# Patient Record
Sex: MALE | Race: WHITE | NOT HISPANIC OR LATINO | URBAN - METROPOLITAN AREA
[De-identification: names, ages, dates, MRNs, and addresses within clinical notes are randomized per-mention and may not be internally consistent; named-entity substitution may affect disease eponyms.]

---

## 2017-04-03 ENCOUNTER — OFFICE VISIT - HEALTHEAST (OUTPATIENT)
Dept: INTERNAL MEDICINE | Facility: CLINIC | Age: 42
End: 2017-04-03

## 2017-04-03 DIAGNOSIS — R50.9 FEVER: ICD-10-CM

## 2017-04-03 DIAGNOSIS — R04.2 HEMOPTYSIS: ICD-10-CM

## 2017-04-03 DIAGNOSIS — J20.9 ACUTE BRONCHITIS, UNSPECIFIED ORGANISM: ICD-10-CM

## 2017-04-03 ASSESSMENT — MIFFLIN-ST. JEOR: SCORE: 1891.66

## 2020-03-16 ENCOUNTER — VIRTUAL VISIT (OUTPATIENT)
Dept: FAMILY MEDICINE | Facility: OTHER | Age: 45
End: 2020-03-16

## 2020-03-16 ENCOUNTER — COMMUNICATION - HEALTHEAST (OUTPATIENT)
Dept: INTERNAL MEDICINE | Facility: CLINIC | Age: 45
End: 2020-03-16

## 2020-03-19 NOTE — PROGRESS NOTES
"Date: 2020 18:44:21  Clinician: Angelina Kevin  Clinician NPI: [id number]  Patient: Adolfo Baig  Patient : 1975  Patient Address: [address], [place] [address]  Patient Phone: [phone]  Visit Protocol: URI  Patient Summary:  Adolfo is a 44 year old ( : 1975 ) male who initiated a Visit for COVID-19 (Coronavirus) evaluation and screening. When asked the question \"Please sign me up to receive news, health information and promotions. \", Adolfo responded \"No\".    Adolfo states his symptoms started 1-2 days ago.   His symptoms consist of malaise, enlarged lymph nodes, myalgia, a sore throat, and a cough. He is experiencing mild difficulty breathing with activities but can speak normally in full sentences.   Symptom details     Cough: Adolfo coughs a few times an hour and his cough is not more bothersome at night. Phlegm does not come into his throat when he coughs. He does not believe his cough is caused by post-nasal drip.     Sore throat: Adolfo reports having mild throat pain (1-3 on a 10 point pain scale), does not have exudate on his tonsils, and can swallow liquids. The lymph nodes in his neck are enlarged. A rash has not appeared on the skin since the sore throat started.      Adolfo denies having ear pain, headache, rhinitis, facial pain or pressure, wheezing, nasal congestion, teeth pain, fever, and chills. He also denies having recent facial or sinus surgery in the past 60 days and taking antibiotic medication for the symptoms.   Precipitating events  Adolfo is not sure if he has been exposed to someone with strep throat. He has not recently been exposed to someone with influenza. Adolfo has been in close contact with the following high risk individuals: children under the age of 5.   Pertinent COVID-19 (Coronavirus) information  Adolfo has not traveled internationally or to the areas where COVID-19 (Coronavirus) is widespread in the last 14 days before the start of his symptoms.   " Adolfo has had close contact with a suspected or laboratory-confirmed COVID-19 patient within 14 days of symptom onset.   Adolfo is not a healthcare worker and does not work in a healthcare facility.   Pertinent medical history  Adolfo needs a return to work/school note.   Weight: 215 lbs   Adolfo does not smoke or use smokeless tobacco.   Additional information as reported by the patient (free text): My wife was exposed to a confirmed case of COVID-19 last Wednesday. We both now have mild symptoms.   Weight: 215 lbs    MEDICATIONS: No current medications, ALLERGIES: NKDA  Clinician Response:  Dear Adolfo,   Based on the information you have provided, you do have symptoms that are consistent with Coronavirus (COVID-19).   The coronavirus causes mild to severe respiratory illness with the most common symptoms including fever, cough and difficulty breathing. Unfortunately, many viruses cause similar symptoms and it can be difficult to distinguish between viruses, especially in mild cases, so we are presuming that anyone with cough or fever has coronavirus at this time.  Coronavirus/COVID-19 has reached the point of community spread in [place], meaning that we are finding the virus in people with no known exposure risk for basia the virus. Given the increasing commonness of coronavirus in the community we are focusing testing on those people at highest risk of developing significant complications of the disease. This includes people who are over age 60, have Hypertension, Diabetes, Heart conditions such as heart failure or previous heart attack, end stage renal disease, chronic liver conditions, COPD or Emphysema, Asthma, Interstitial Lung Disease, Cancer, transplant recipients, HIV, individuals on chemotherapy or immunosuppressive medications.  If you believe you may have a condition that puts you at high risk that is not listed above, please contact your specialty provider to discuss if you should be tested.   Those such as yourself who are younger and healthy may not be offered testing and should assume are infected with coronavirus. Accordingly, you should self-quarantine for fourteen days from the first day your symptoms started. You should call if you find increasing shortness of breath, wheezing or sustained fever above 101.5. If you are significantly short of breath or experience chest pain you should call 911 or report to the nearest emergency department for urgent evaluation.   Isolate Yourself:    Isolate yourself at home.   Do Not allow any visitors  Do Not go to work or school  Do Not go to [employer],  centers, shopping, or other public places.  Do Not shake hands.  Avoid close contact with others (hugging, kissing).Protect Others:     Cover Your Mouth and Nose with a mask, disposable tissue or wash cloth to avoid spreading germs to others.  Wash your hands and face frequently with soap and water.   If you develop significant shortness of breath that prevents you from doing normal activities, please call 911 or proceed to the nearest emergency room and alert them immediately that you have been in self-isolation for possible coronavirus.   For more information about COVID19 and options for caring for yourself at home, please visit the CDC website at https://www.cdc.gov/coronavirus/2019-ncov/about/steps-when-sick.htmlFor more options for care at [facility], please visit our website at [email or web address]     Diagnosis: Cough  Diagnosis ICD: R05

## 2020-09-02 ENCOUNTER — NURSE TRIAGE (OUTPATIENT)
Dept: NURSING | Facility: CLINIC | Age: 45
End: 2020-09-02

## 2020-09-02 NOTE — TELEPHONE ENCOUNTER
"Pt calls in asking for anti body testing for covid  Says had rapid test done in a \" pop up place\" on [address] in [facility] - about a month ago-- says it came back negative - pt concerned it was a false negative    Pt then goes on to say for the last month   Has had a >    Sore throat  Non-productive cough   Muscle aches   Slight SOB - like \" theres a cloth over my mouth \" - when puts mask on - is even worst - says its \" like I cant get a full breath \"  No fever    Pt talking in full sentences -   No labored breathing noted    Explained antibody testing is for when you have been asymptomatic for 14 days   That pt needs to have a nasopharyngeal test done for covid  Advised  1 -[email or web address] ( help number also given )  2- call clinic  3- [facility]     Also advised if spikes a temp -104-105 or if breathing DOES become labored - more difficulty breathing to go to ED     Protocol and care advice reviewed  Caller states understanding of the recommended disposition  Advised to call back if further questions or concerns    David Varner RN / [facility]              Reason for Disposition    [1] Caller requesting NON-URGENT health information AND [2] PCP's office is the best resource    Protocols used: INFORMATION ONLY CALL-A-AH      "

## 2021-05-30 VITALS — WEIGHT: 207.2 LBS | HEIGHT: 74 IN | BODY MASS INDEX: 26.59 KG/M2

## 2021-06-09 NOTE — PROGRESS NOTES
ASSESSMENT:  1. Acute bronchitis, unspecified organism  Acute bronchitis, cannot rule out influenza last week (less reliable rapid test at this point).  X-ray was done to check for secondary bacterial pneumonia as well as evaluate other causes for the one episode of hemoptysis- this was normal.  Treat for bronchitis with prednisone.  I think the difficulty breathing stems from history of asthma.  Vitals normal.   - predniSONE (DELTASONE) 20 MG tablet; Take 2 tablets (40 mg total) by mouth daily for 7 days.  Dispense: 14 tablet; Refill: 0    2. Hemoptysis  See #1  - XR Chest PA and Lateral    3. Fever  Negative  - Influenza A/B Rapid Test    PLAN:  Patient Instructions   Acute bronchitis    This could be a secondary infection.  No sign of pneumonia, no lung masses.      Prednisone for breathing (helps dilate airways)       Orders Placed This Encounter   Procedures     Influenza A/B Rapid Test     XR Chest PA and Lateral     Order Specific Question:   Reason for Exam (Describe Symptoms):     Answer:   hemoptysis     Order Specific Question:   Can the procedure be changed per Radiologist protocol?     Answer:   Yes     Medications Discontinued During This Encounter   Medication Reason     fluticasone (FLONASE) 50 mcg/actuation nasal spray Therapy completed       Return if symptoms worsen or fail to improve, for Next scheduled follow up.    CHIEF COMPLAINT:  Chief Complaint   Patient presents with     Cough     x 1 week     Fever     on and off     Rash     Routine Health Maintenance     tdap       HISTORY OF PRESENT ILLNESS:  Adolfo is a 41 y.o. male presenting to the clinic today with complaints of hemoptysis. He reports that last week he and his wife began to feel very ill and he believes that he contracted influenza. His symptoms began suddenly with onset of fever, diffuse muscle aches, severe head aches and a progressive cough. He has felt short of breath for the past 4 days and for the past 48 hours he has noticed  "crackles in his lungs when he takes a deep breath. He has been avoiding breathing deeply so as to avoid provoking a coughing spell. He also mentions that he feels as though his lungs are ripping apart when he coughs. Two days ago, during a violent coughing fit, he had an episode of hemoptysis wherein he noticed a large amount of blood mixed with his sputum. This morning, during another coughing fit he expectorated thick, dark sputum that he thinks might have also had some blood mixed in. He has felt exceedingly short of breath and unable to get sufficiently perfused. Of note, he recalls a history of adolescent asthma but has not had any breathing issues since he was about 11.     REVIEW OF SYSTEMS:   He denies any diarrhea.   All other systems are negative.    PFSH:  He stopped smoking 6 years ago.   He has a 2 year old son who started coughing 2 days ago.     TOBACCO USE:  History   Smoking Status     Former Smoker     Types: Cigarettes     Start date: 1993     Quit date: 2010   Smokeless Tobacco     Not on file       VITALS:  Vitals:    04/03/17 1458   BP: 124/86   Patient Site: Left Arm   Patient Position: Sitting   Cuff Size: Adult Regular   Pulse: 84   Temp: 98.4  F (36.9  C)   Weight: 207 lb 3.2 oz (94 kg)   Height: 6' 1.5\" (1.867 m)     Wt Readings from Last 3 Encounters:   04/03/17 207 lb 3.2 oz (94 kg)   06/27/16 211 lb 12.8 oz (96.1 kg)       PHYSICAL EXAM:  Lungs: Right lower lung crackles. And upper airway crackles in the anterior lung fields. Mildly labored breathing.   Heart:  Regular rate and rhythm, no murmurs.   General:  Alert, pleasant male. Appears ill.   Oxymetry: 98%. Heart rate 74.    X-ray:  No cardiomegaly or pleural effusion or infiltrate.     ADDITIONAL HISTORY SUMMARIZED (2): Notes from 6/2016 reviewed regarding health maintenance.  DECISION TO OBTAIN EXTRA INFORMATION (1): None.   RADIOLOGY TESTS (1): Chest x-ray ordered.  LABS (1): Labs ordered.  MEDICINE TESTS (1): None.  INDEPENDENT " REVIEW (2 each): X-ray personally interpreted.     QUALITY MEASURES:  The following high BMI interventions were performed this visit: encouragement to exercise     The visit lasted a total of 20 minutes face to face with the patient. Over 50% of the time was spent counseling and educating the patient about influenza and bronchitis.    IFranko, am scribing for and in the presence of, Dr. Curry.    I, Dr. Curry, personally performed the services described in this documentation, as scribed by Franko Scruggs in my presence, and it is both accurate and complete.    MEDICATIONS:  Current Outpatient Prescriptions   Medication Sig Dispense Refill     predniSONE (DELTASONE) 20 MG tablet Take 2 tablets (40 mg total) by mouth daily for 7 days. 14 tablet 0     No current facility-administered medications for this visit.        Total data points: 6.     Franko Curry, DO  Internal Medicine  [facility]

## 2021-06-27 ENCOUNTER — HEALTH MAINTENANCE LETTER (OUTPATIENT)
Age: 46
End: 2021-06-27

## 2021-10-17 ENCOUNTER — HEALTH MAINTENANCE LETTER (OUTPATIENT)
Age: 46
End: 2021-10-17

## 2022-07-23 ENCOUNTER — HEALTH MAINTENANCE LETTER (OUTPATIENT)
Age: 47
End: 2022-07-23

## 2022-10-01 ENCOUNTER — HEALTH MAINTENANCE LETTER (OUTPATIENT)
Age: 47
End: 2022-10-01

## 2023-04-14 ENCOUNTER — LAB REQUISITION (OUTPATIENT)
Dept: LAB | Facility: CLINIC | Age: 48
End: 2023-04-14

## 2023-04-14 DIAGNOSIS — M25.50 PAIN IN UNSPECIFIED JOINT: ICD-10-CM

## 2023-04-14 DIAGNOSIS — H57.89 OTHER SPECIFIED DISORDERS OF EYE AND ADNEXA: ICD-10-CM

## 2023-04-14 DIAGNOSIS — Z80.42 FAMILY HISTORY OF MALIGNANT NEOPLASM OF PROSTATE: ICD-10-CM

## 2023-04-14 DIAGNOSIS — Z13.220 ENCOUNTER FOR SCREENING FOR LIPOID DISORDERS: ICD-10-CM

## 2023-04-14 LAB
ALBUMIN SERPL BCG-MCNC: 4.6 G/DL (ref 3.5–5.2)
ALP SERPL-CCNC: 59 U/L (ref 40–129)
ALT SERPL W P-5'-P-CCNC: 32 U/L (ref 10–50)
ANION GAP SERPL CALCULATED.3IONS-SCNC: 17 MMOL/L (ref 7–15)
AST SERPL W P-5'-P-CCNC: 22 U/L (ref 10–50)
BILIRUB SERPL-MCNC: 0.4 MG/DL
BUN SERPL-MCNC: 14.2 MG/DL (ref 6–20)
CALCIUM SERPL-MCNC: 9.7 MG/DL (ref 8.6–10)
CHLORIDE SERPL-SCNC: 104 MMOL/L (ref 98–107)
CHOLEST SERPL-MCNC: 196 MG/DL
CREAT SERPL-MCNC: 0.82 MG/DL (ref 0.67–1.17)
CRP SERPL-MCNC: <3 MG/L
DEPRECATED HCO3 PLAS-SCNC: 20 MMOL/L (ref 22–29)
ERYTHROCYTE [SEDIMENTATION RATE] IN BLOOD BY WESTERGREN METHOD: 10 MM/HR (ref 0–15)
GFR SERPL CREATININE-BSD FRML MDRD: >90 ML/MIN/1.73M2
GLUCOSE SERPL-MCNC: 92 MG/DL (ref 70–99)
HDLC SERPL-MCNC: 45 MG/DL
LDLC SERPL CALC-MCNC: 132 MG/DL
NONHDLC SERPL-MCNC: 151 MG/DL
POTASSIUM SERPL-SCNC: 4.4 MMOL/L (ref 3.4–5.3)
PROT SERPL-MCNC: 6.9 G/DL (ref 6.4–8.3)
PSA SERPL DL<=0.01 NG/ML-MCNC: 1.77 NG/ML (ref 0–2.5)
SODIUM SERPL-SCNC: 141 MMOL/L (ref 136–145)
TRIGL SERPL-MCNC: 94 MG/DL
TSH SERPL DL<=0.005 MIU/L-ACNC: 0.99 UIU/ML (ref 0.3–4.2)

## 2023-04-14 PROCEDURE — G0103 PSA SCREENING: HCPCS | Performed by: PHYSICIAN ASSISTANT

## 2023-04-14 PROCEDURE — 81374 HLA I TYPING 1 ANTIGEN LR: CPT | Performed by: PHYSICIAN ASSISTANT

## 2023-04-14 PROCEDURE — 80061 LIPID PANEL: CPT | Performed by: PHYSICIAN ASSISTANT

## 2023-04-14 PROCEDURE — 84443 ASSAY THYROID STIM HORMONE: CPT | Performed by: PHYSICIAN ASSISTANT

## 2023-04-14 PROCEDURE — 80053 COMPREHEN METABOLIC PANEL: CPT | Performed by: PHYSICIAN ASSISTANT

## 2023-04-14 PROCEDURE — 85652 RBC SED RATE AUTOMATED: CPT | Performed by: PHYSICIAN ASSISTANT

## 2023-04-14 PROCEDURE — 86235 NUCLEAR ANTIGEN ANTIBODY: CPT | Performed by: PHYSICIAN ASSISTANT

## 2023-04-14 PROCEDURE — 86431 RHEUMATOID FACTOR QUANT: CPT | Performed by: PHYSICIAN ASSISTANT

## 2023-04-14 PROCEDURE — 86038 ANTINUCLEAR ANTIBODIES: CPT | Performed by: PHYSICIAN ASSISTANT

## 2023-04-14 PROCEDURE — 86140 C-REACTIVE PROTEIN: CPT | Performed by: PHYSICIAN ASSISTANT

## 2023-04-17 LAB
ANA PAT SER IF-IMP: ABNORMAL
ANA SER QL IF: ABNORMAL
ANA TITR SER IF: ABNORMAL {TITER}
ENA SS-A AB SER IA-ACNC: <0.5 U/ML
ENA SS-A AB SER IA-ACNC: NEGATIVE
ENA SS-B IGG SER IA-ACNC: <0.6 U/ML
ENA SS-B IGG SER IA-ACNC: NEGATIVE
RHEUMATOID FACT SER NEPH-ACNC: <7 IU/ML

## 2023-04-21 LAB
B LOCUS: NORMAL
B27TEST METHOD: NORMAL

## 2023-05-04 ENCOUNTER — LAB REQUISITION (OUTPATIENT)
Dept: LAB | Facility: CLINIC | Age: 48
End: 2023-05-04

## 2023-05-04 DIAGNOSIS — M79.10 MYALGIA, UNSPECIFIED SITE: ICD-10-CM

## 2023-05-04 LAB
CK SERPL-CCNC: 89 U/L (ref 39–308)
MAGNESIUM SERPL-MCNC: 2.1 MG/DL (ref 1.7–2.3)
VIT B12 SERPL-MCNC: 463 PG/ML (ref 232–1245)

## 2023-05-04 PROCEDURE — 83735 ASSAY OF MAGNESIUM: CPT | Performed by: PHYSICIAN ASSISTANT

## 2023-05-04 PROCEDURE — 82306 VITAMIN D 25 HYDROXY: CPT | Performed by: PHYSICIAN ASSISTANT

## 2023-05-04 PROCEDURE — 82550 ASSAY OF CK (CPK): CPT | Performed by: PHYSICIAN ASSISTANT

## 2023-05-04 PROCEDURE — 83825 ASSAY OF MERCURY: CPT | Performed by: PHYSICIAN ASSISTANT

## 2023-05-04 PROCEDURE — 82607 VITAMIN B-12: CPT | Performed by: PHYSICIAN ASSISTANT

## 2023-05-05 LAB — DEPRECATED CALCIDIOL+CALCIFEROL SERPL-MC: 34 UG/L (ref 20–75)

## 2023-05-07 LAB
ARSENIC BLD-MCNC: <10 UG/L
LEAD BLDV-MCNC: <2 UG/DL
MERCURY BLD-MCNC: 3.1 UG/L

## 2023-05-15 ENCOUNTER — LAB REQUISITION (OUTPATIENT)
Dept: LAB | Facility: CLINIC | Age: 48
End: 2023-05-15

## 2023-05-15 DIAGNOSIS — M79.10 MYALGIA, UNSPECIFIED SITE: ICD-10-CM

## 2023-05-15 PROCEDURE — 86160 COMPLEMENT ANTIGEN: CPT | Performed by: PHYSICIAN ASSISTANT

## 2023-05-15 PROCEDURE — 86225 DNA ANTIBODY NATIVE: CPT | Performed by: PHYSICIAN ASSISTANT

## 2023-05-15 PROCEDURE — 86618 LYME DISEASE ANTIBODY: CPT | Performed by: PHYSICIAN ASSISTANT

## 2023-05-16 LAB
B BURGDOR IGG+IGM SER QL: 0.12
C3 SERPL-MCNC: 145 MG/DL (ref 81–157)

## 2023-05-18 LAB — DSDNA AB SER-ACNC: <0.6 IU/ML

## 2023-05-22 ENCOUNTER — LAB REQUISITION (OUTPATIENT)
Dept: LAB | Facility: CLINIC | Age: 48
End: 2023-05-22

## 2023-05-22 DIAGNOSIS — M79.10 MYALGIA, UNSPECIFIED SITE: ICD-10-CM

## 2023-05-22 LAB
BASOPHILS # BLD AUTO: 0.1 10E3/UL (ref 0–0.2)
BASOPHILS NFR BLD AUTO: 1 %
EOSINOPHIL # BLD AUTO: 0.3 10E3/UL (ref 0–0.7)
EOSINOPHIL NFR BLD AUTO: 4 %
ERYTHROCYTE [DISTWIDTH] IN BLOOD BY AUTOMATED COUNT: 13 % (ref 10–15)
HCT VFR BLD AUTO: 46.4 % (ref 40–53)
HGB BLD-MCNC: 15.2 G/DL (ref 13.3–17.7)
IMM GRANULOCYTES # BLD: 0 10E3/UL
IMM GRANULOCYTES NFR BLD: 0 %
LYMPHOCYTES # BLD AUTO: 2.4 10E3/UL (ref 0.8–5.3)
LYMPHOCYTES NFR BLD AUTO: 30 %
MCH RBC QN AUTO: 29.6 PG (ref 26.5–33)
MCHC RBC AUTO-ENTMCNC: 32.8 G/DL (ref 31.5–36.5)
MCV RBC AUTO: 90 FL (ref 78–100)
MONOCYTES # BLD AUTO: 0.9 10E3/UL (ref 0–1.3)
MONOCYTES NFR BLD AUTO: 11 %
NEUTROPHILS # BLD AUTO: 4.4 10E3/UL (ref 1.6–8.3)
NEUTROPHILS NFR BLD AUTO: 54 %
NRBC # BLD AUTO: 0 10E3/UL
NRBC BLD AUTO-RTO: 0 /100
PLATELET # BLD AUTO: 327 10E3/UL (ref 150–450)
RBC # BLD AUTO: 5.14 10E6/UL (ref 4.4–5.9)
RETICS # AUTO: 0.08 10E6/UL (ref 0.03–0.1)
RETICS/RBC NFR AUTO: 1.6 % (ref 0.5–2)
TOTAL PROTEIN SERUM FOR ELP: 6.9 G/DL (ref 6.4–8.3)
WBC # BLD AUTO: 8.1 10E3/UL (ref 4–11)

## 2023-05-22 PROCEDURE — 84165 PROTEIN E-PHORESIS SERUM: CPT | Performed by: PATHOLOGY

## 2023-05-22 PROCEDURE — 84155 ASSAY OF PROTEIN SERUM: CPT | Performed by: PHYSICIAN ASSISTANT

## 2023-05-22 PROCEDURE — 85045 AUTOMATED RETICULOCYTE COUNT: CPT | Performed by: PHYSICIAN ASSISTANT

## 2023-05-22 PROCEDURE — 85060 BLOOD SMEAR INTERPRETATION: CPT | Performed by: PATHOLOGY

## 2023-05-22 PROCEDURE — 85025 COMPLETE CBC W/AUTO DIFF WBC: CPT | Performed by: PHYSICIAN ASSISTANT

## 2023-05-23 LAB
PATH REPORT.COMMENTS IMP SPEC: NORMAL
PATH REPORT.COMMENTS IMP SPEC: NORMAL
PATH REPORT.FINAL DX SPEC: NORMAL
PATH REPORT.RELEVANT HX SPEC: NORMAL

## 2023-05-24 ENCOUNTER — TRANSCRIBE ORDERS (OUTPATIENT)
Dept: OTHER | Age: 48
End: 2023-05-24

## 2023-05-24 DIAGNOSIS — M79.10 MYALGIA: Primary | ICD-10-CM

## 2023-05-24 DIAGNOSIS — Z86.16 PERSONAL HISTORY OF COVID-19: ICD-10-CM

## 2023-05-25 LAB
ALBUMIN SERPL ELPH-MCNC: 4.5 G/DL (ref 3.7–5.1)
ALPHA1 GLOB SERPL ELPH-MCNC: 0.3 G/DL (ref 0.2–0.4)
ALPHA2 GLOB SERPL ELPH-MCNC: 0.6 G/DL (ref 0.5–0.9)
B-GLOBULIN SERPL ELPH-MCNC: 0.7 G/DL (ref 0.6–1)
GAMMA GLOB SERPL ELPH-MCNC: 0.8 G/DL (ref 0.7–1.6)
M PROTEIN SERPL ELPH-MCNC: 0 G/DL
PROT PATTERN SERPL ELPH-IMP: NORMAL

## 2023-05-26 ENCOUNTER — TRANSCRIBE ORDERS (OUTPATIENT)
Dept: OTHER | Age: 48
End: 2023-05-26

## 2023-05-26 DIAGNOSIS — M25.50 ARTHRALGIA OF MULTIPLE JOINTS: Primary | ICD-10-CM

## 2023-05-31 ENCOUNTER — LAB REQUISITION (OUTPATIENT)
Dept: LAB | Facility: CLINIC | Age: 48
End: 2023-05-31

## 2023-05-31 DIAGNOSIS — M79.10 MYALGIA, UNSPECIFIED SITE: ICD-10-CM

## 2023-05-31 PROCEDURE — 83519 RIA NONANTIBODY: CPT | Performed by: PHYSICIAN ASSISTANT

## 2023-06-02 LAB — ACHR BIND AB SER-SCNC: 0 NMOL/L

## 2023-06-19 LAB — MAYO MISC RESULT: NORMAL

## 2023-06-20 LAB
ACHR BIND IGG+IGM SER IA-SCNC: 0 NMOL/L
IMMUNOLOGIST REVIEW: NORMAL

## 2023-08-12 ENCOUNTER — HEALTH MAINTENANCE LETTER (OUTPATIENT)
Age: 48
End: 2023-08-12